# Patient Record
Sex: FEMALE | Race: OTHER | HISPANIC OR LATINO | URBAN - METROPOLITAN AREA
[De-identification: names, ages, dates, MRNs, and addresses within clinical notes are randomized per-mention and may not be internally consistent; named-entity substitution may affect disease eponyms.]

---

## 2024-10-10 ENCOUNTER — OUTPATIENT (OUTPATIENT)
Dept: OUTPATIENT SERVICES | Facility: HOSPITAL | Age: 34
LOS: 1 days | End: 2024-10-10
Payer: COMMERCIAL

## 2024-10-10 VITALS
DIASTOLIC BLOOD PRESSURE: 87 MMHG | SYSTOLIC BLOOD PRESSURE: 119 MMHG | OXYGEN SATURATION: 99 % | RESPIRATION RATE: 16 BRPM | HEART RATE: 80 BPM | TEMPERATURE: 99 F

## 2024-10-10 DIAGNOSIS — O26.899 OTHER SPECIFIED PREGNANCY RELATED CONDITIONS, UNSPECIFIED TRIMESTER: ICD-10-CM

## 2024-10-10 LAB
APPEARANCE UR: CLEAR — SIGNIFICANT CHANGE UP
BILIRUB UR-MCNC: NEGATIVE — SIGNIFICANT CHANGE UP
COLOR SPEC: YELLOW — SIGNIFICANT CHANGE UP
DIFF PNL FLD: NEGATIVE — SIGNIFICANT CHANGE UP
FIBRONECTIN FETAL SPEC QL: NEGATIVE — SIGNIFICANT CHANGE UP
GLUCOSE UR QL: NEGATIVE MG/DL — SIGNIFICANT CHANGE UP
KETONES UR-MCNC: NEGATIVE MG/DL — SIGNIFICANT CHANGE UP
LEUKOCYTE ESTERASE UR-ACNC: NEGATIVE — SIGNIFICANT CHANGE UP
NITRITE UR-MCNC: NEGATIVE — SIGNIFICANT CHANGE UP
PH UR: 6.5 — SIGNIFICANT CHANGE UP (ref 5–8)
PROT UR-MCNC: NEGATIVE MG/DL — SIGNIFICANT CHANGE UP
SP GR SPEC: 1 — LOW (ref 1–1.03)
UROBILINOGEN FLD QL: 0.2 MG/DL — SIGNIFICANT CHANGE UP (ref 0.2–1)

## 2024-10-10 PROCEDURE — 59025 FETAL NON-STRESS TEST: CPT

## 2024-10-10 PROCEDURE — 76817 TRANSVAGINAL US OBSTETRIC: CPT | Mod: 26

## 2024-10-10 PROCEDURE — 76818 FETAL BIOPHYS PROFILE W/NST: CPT

## 2024-10-10 PROCEDURE — 81003 URINALYSIS AUTO W/O SCOPE: CPT

## 2024-10-10 PROCEDURE — 76817 TRANSVAGINAL US OBSTETRIC: CPT

## 2024-10-10 PROCEDURE — G0463: CPT

## 2024-10-10 PROCEDURE — 96360 HYDRATION IV INFUSION INIT: CPT

## 2024-10-10 PROCEDURE — 99213 OFFICE O/P EST LOW 20 MIN: CPT

## 2024-10-10 PROCEDURE — 76818 FETAL BIOPHYS PROFILE W/NST: CPT | Mod: 26

## 2024-10-10 PROCEDURE — 82731 ASSAY OF FETAL FIBRONECTIN: CPT

## 2024-10-10 RX ORDER — SODIUM CHLORIDE IRRIG SOLUTION 0.9 %
1000 SOLUTION, IRRIGATION IRRIGATION ONCE
Refills: 0 | Status: COMPLETED | OUTPATIENT
Start: 2024-10-10 | End: 2024-10-10

## 2024-10-10 RX ADMIN — Medication 1000 MILLILITER(S): at 21:15

## 2024-10-10 NOTE — OB PROVIDER TRIAGE NOTE - ADDITIONAL INSTRUCTIONS
ua neg  cx 4cm  ffn neg  discharge home   return to  for nst as per    precations  [facility]

## 2024-10-10 NOTE — OB PROVIDER TRIAGE NOTE - NSHPLABSRESULTS_GEN_ALL_CORE
< from: US Fetal Bio Profile w/Non-Stress (10.10.24 @ 20:21) >    PROCEDURE DATE:  10/10/2024          INTERPRETATION:  US OB TRANSVAGINAL, US OB FETAL BIOPHYSICAL WITH   NONSTRESS    HISTORY:   labor    contractions. Pregnancy evaluation.    Transabdominal pelvic ultrasound:    Biophysical profile was performed.   Scores were identified as follows:      Movement: . . . . .  2    Tone: . . . . .. . . .  2    Fluid: . . . . . . . . . . 2    Breathing: . . . . . . 2     single live Intrauterine gestation is present in a CEPHALIC   presentation. The placenta is FUNDAL without previa. Amniotic fluid   volume is within normal limits with the AFImeasuring  14.7  cm.   transvaginal evaluation of the cervix demonstrates a closed cervix   measuring 4.4 cm in length.    Fetal motion is seen in real-time and the fetal heart rate measures 134    bpm.    IMPRESSION:  Single live intrauterine gestation in cephalic presentation with a fundal   placenta. The cervix is closed.  Biophysical profile score is 8/8.    --- End of Report ---      < end of copied text >

## 2024-10-10 NOTE — OB PROVIDER TRIAGE NOTE - HISTORY OF PRESENT ILLNESS
34y.o f siup  ega 32.1wks came from office for r/o  ctxs.  pt reports cramps for few weeks. pt reports no pain at this time  +fm no vb, no lof  ob pnc Dr. boyce  obhx sab 10wks with D&c           sab with pills   surghx ex lap for endometrioma   meds pnv  social no s/a/d  gynhx no stds

## 2024-10-10 NOTE — OB PROVIDER TRIAGE NOTE - YOU WERE IN THE HOSPITAL FOR:
ua neg  cx 4cm  ffn neg  discharge home   return to  for nst as per    precations  [facility]

## 2024-10-10 NOTE — OB RN TRIAGE NOTE - NSNURSINGINSTR_OBGYN_ALL_OB_FT
pt d/c home with  labor precautions by SHAI Landin reinforced by RN, instructed to return to l&d on eliane 10/13/24 for NST, pt to return to l&d  if leakage of fluid, vaginal bleeding, decreased or no fetal movement, increase water intake to 2-3litres per day, f/u with next OB/GYN scheduled appointment., pt verbalized understanding with good teach back, pt left unit in stable condition accompanied by her significant other.

## 2024-10-10 NOTE — OB PROVIDER TRIAGE NOTE - NSHPPHYSICALEXAM_GEN_ALL_CORE
gen 34y.o f AAox3 nad  abd bs+ soft gravid nontender  nst cat I  toco ctx irreg  pelvic closed/long/intact  ext neg tenderness

## 2024-10-10 NOTE — OB PROVIDER TRIAGE NOTE - NSOBPROVIDERNOTE_OBGYN_ALL_OB_FT
34y.o f siup at 32.1wks with  ctxs  ua   ivf  sono   d/w dr.Spyropolous call[place]ay    ua neg  cx 4cm  ffn neg  discharge home   return to  for nst as per    precations  [facility]

## 2024-10-10 NOTE — OB RN TRIAGE NOTE - CHIEF COMPLAINT QUOTE
" I was sent from my doctors office because I was having mild contractions" pt admits to fetal movement

## 2024-10-10 NOTE — OB RN TRIAGE NOTE - FALL HARM RISK - UNIVERSAL INTERVENTIONS
Bed in lowest position, wheels locked, appropriate side rails in place/Call bell, personal items and telephone in reach/Instruct patient to call for assistance before getting out of bed or chair/Non-slip footwear when patient is out of bed/Andes to call system/Physically safe environment - no spills, clutter or unnecessary equipment/Purposeful Proactive Rounding/Room/bathroom lighting operational, light cord in reach

## 2024-10-14 DIAGNOSIS — Z3A.32 32 WEEKS GESTATION OF PREGNANCY: ICD-10-CM

## 2024-10-14 DIAGNOSIS — O09.293 SUPERVISION OF PREGNANCY WITH OTHER POOR REPRODUCTIVE OR OBSTETRIC HISTORY, THIRD TRIMESTER: ICD-10-CM

## 2024-10-14 DIAGNOSIS — O47.03 FALSE LABOR BEFORE 37 COMPLETED WEEKS OF GESTATION, THIRD TRIMESTER: ICD-10-CM

## 2024-10-25 ENCOUNTER — ASOB RESULT (OUTPATIENT)
Age: 34
End: 2024-10-25

## 2024-10-25 ENCOUNTER — APPOINTMENT (OUTPATIENT)
Dept: ANTEPARTUM | Facility: CLINIC | Age: 34
End: 2024-10-25
Payer: COMMERCIAL

## 2024-10-25 PROCEDURE — 76819 FETAL BIOPHYS PROFIL W/O NST: CPT | Mod: 59

## 2024-10-25 PROCEDURE — 76816 OB US FOLLOW-UP PER FETUS: CPT
